# Patient Record
Sex: FEMALE | Race: WHITE | NOT HISPANIC OR LATINO | URBAN - METROPOLITAN AREA
[De-identification: names, ages, dates, MRNs, and addresses within clinical notes are randomized per-mention and may not be internally consistent; named-entity substitution may affect disease eponyms.]

---

## 2020-02-25 ENCOUNTER — APPOINTMENT (RX ONLY)
Dept: URBAN - METROPOLITAN AREA CLINIC 123 | Facility: CLINIC | Age: 75
Setting detail: DERMATOLOGY
End: 2020-02-25

## 2020-02-25 DIAGNOSIS — D22 MELANOCYTIC NEVI: ICD-10-CM

## 2020-02-25 DIAGNOSIS — L82.1 OTHER SEBORRHEIC KERATOSIS: ICD-10-CM

## 2020-02-25 DIAGNOSIS — L81.4 OTHER MELANIN HYPERPIGMENTATION: ICD-10-CM

## 2020-02-25 DIAGNOSIS — D18.0 HEMANGIOMA: ICD-10-CM

## 2020-02-25 PROBLEM — D18.01 HEMANGIOMA OF SKIN AND SUBCUTANEOUS TISSUE: Status: ACTIVE | Noted: 2020-02-25

## 2020-02-25 PROBLEM — D22.5 MELANOCYTIC NEVI OF TRUNK: Status: ACTIVE | Noted: 2020-02-25

## 2020-02-25 PROCEDURE — ? FULL BODY SKIN EXAM

## 2020-02-25 PROCEDURE — 99203 OFFICE O/P NEW LOW 30 MIN: CPT

## 2020-02-25 PROCEDURE — ? COUNSELING

## 2020-02-25 ASSESSMENT — LOCATION DETAILED DESCRIPTION DERM
LOCATION DETAILED: SUPERIOR THORACIC SPINE
LOCATION DETAILED: LEFT ULNAR DORSAL HAND
LOCATION DETAILED: PERIUMBILICAL SKIN
LOCATION DETAILED: RIGHT RADIAL DORSAL HAND
LOCATION DETAILED: LEFT INFERIOR CENTRAL MALAR CHEEK

## 2020-02-25 ASSESSMENT — LOCATION ZONE DERM
LOCATION ZONE: FACE
LOCATION ZONE: TRUNK
LOCATION ZONE: HAND

## 2020-02-25 ASSESSMENT — LOCATION SIMPLE DESCRIPTION DERM
LOCATION SIMPLE: ABDOMEN
LOCATION SIMPLE: RIGHT HAND
LOCATION SIMPLE: LEFT HAND
LOCATION SIMPLE: UPPER BACK
LOCATION SIMPLE: LEFT CHEEK

## 2020-02-25 NOTE — PROCEDURE: MIPS QUALITY
Quality 402: Tobacco Use And Help With Quitting Among Adolescents: Patient screened for tobacco and is an ex-smoker
Quality 431: Preventive Care And Screening: Unhealthy Alcohol Use - Screening: Patient screened for unhealthy alcohol use using a single question and scores 2 or greater episodes per year and brief intervention occurred
Detail Level: Detailed
Quality 130: Documentation Of Current Medications In The Medical Record: Current Medications Documented

## 2021-09-14 ENCOUNTER — APPOINTMENT (RX ONLY)
Dept: URBAN - METROPOLITAN AREA CLINIC 123 | Facility: CLINIC | Age: 76
Setting detail: DERMATOLOGY
End: 2021-09-14

## 2021-09-14 DIAGNOSIS — L82.1 OTHER SEBORRHEIC KERATOSIS: ICD-10-CM

## 2021-09-14 DIAGNOSIS — L81.4 OTHER MELANIN HYPERPIGMENTATION: ICD-10-CM

## 2021-09-14 DIAGNOSIS — L91.8 OTHER HYPERTROPHIC DISORDERS OF THE SKIN: ICD-10-CM

## 2021-09-14 DIAGNOSIS — D22 MELANOCYTIC NEVI: ICD-10-CM

## 2021-09-14 DIAGNOSIS — L98.8 OTHER SPECIFIED DISORDERS OF THE SKIN AND SUBCUTANEOUS TISSUE: ICD-10-CM

## 2021-09-14 DIAGNOSIS — L81.5 LEUKODERMA, NOT ELSEWHERE CLASSIFIED: ICD-10-CM

## 2021-09-14 DIAGNOSIS — F42.4 EXCORIATION (SKIN-PICKING) DISORDER: ICD-10-CM

## 2021-09-14 DIAGNOSIS — D18.0 HEMANGIOMA: ICD-10-CM

## 2021-09-14 PROBLEM — D18.01 HEMANGIOMA OF SKIN AND SUBCUTANEOUS TISSUE: Status: ACTIVE | Noted: 2021-09-14

## 2021-09-14 PROBLEM — D23.72 OTHER BENIGN NEOPLASM OF SKIN OF LEFT LOWER LIMB, INCLUDING HIP: Status: ACTIVE | Noted: 2021-09-14

## 2021-09-14 PROBLEM — S30.91XA UNSPECIFIED SUPERFICIAL INJURY OF LOWER BACK AND PELVIS, INITIAL ENCOUNTER: Status: ACTIVE | Noted: 2021-09-14

## 2021-09-14 PROBLEM — D22.5 MELANOCYTIC NEVI OF TRUNK: Status: ACTIVE | Noted: 2021-09-14

## 2021-09-14 PROCEDURE — ? ADDITIONAL NOTES

## 2021-09-14 PROCEDURE — ? COUNSELING

## 2021-09-14 PROCEDURE — 99213 OFFICE O/P EST LOW 20 MIN: CPT

## 2021-09-14 PROCEDURE — ? BOTOX (U OR CC)

## 2021-09-14 PROCEDURE — ? SUNSCREEN RECOMMENDATIONS

## 2021-09-14 PROCEDURE — ? FULL BODY SKIN EXAM

## 2021-09-14 PROCEDURE — ? TREATMENT REGIMEN

## 2021-09-14 ASSESSMENT — LOCATION DETAILED DESCRIPTION DERM
LOCATION DETAILED: LEFT CENTRAL EYEBROW
LOCATION DETAILED: RIGHT PROXIMAL DORSAL FOREARM
LOCATION DETAILED: LEFT MEDIAL SUPERIOR CHEST
LOCATION DETAILED: LEFT PROXIMAL DORSAL FOREARM
LOCATION DETAILED: GLABELLA
LOCATION DETAILED: RIGHT FOREHEAD
LOCATION DETAILED: RIGHT SUPERIOR UPPER BACK
LOCATION DETAILED: LEFT LATERAL SUPERIOR EYELID
LOCATION DETAILED: RIGHT LATERAL EYEBROW
LOCATION DETAILED: RIGHT SUPERIOR LATERAL MALAR CHEEK
LOCATION DETAILED: LEFT INFERIOR LATERAL LOWER BACK
LOCATION DETAILED: EPIGASTRIC SKIN
LOCATION DETAILED: LEFT MEDIAL EYEBROW
LOCATION DETAILED: RIGHT CENTRAL EYEBROW
LOCATION DETAILED: LEFT FOREHEAD
LOCATION DETAILED: LEFT INFERIOR UPPER BACK
LOCATION DETAILED: RIGHT MEDIAL EYEBROW
LOCATION DETAILED: LEFT SUPERIOR CENTRAL MALAR CHEEK
LOCATION DETAILED: RIGHT INFERIOR TEMPLE
LOCATION DETAILED: LEFT LATERAL CANTHUS
LOCATION DETAILED: LEFT LATERAL EYEBROW
LOCATION DETAILED: RIGHT MEDIAL FOREHEAD

## 2021-09-14 ASSESSMENT — LOCATION SIMPLE DESCRIPTION DERM
LOCATION SIMPLE: LEFT SUPERIOR EYELID
LOCATION SIMPLE: RIGHT CHEEK
LOCATION SIMPLE: RIGHT TEMPLE
LOCATION SIMPLE: LEFT EYELID
LOCATION SIMPLE: LEFT FOREARM
LOCATION SIMPLE: CHEST
LOCATION SIMPLE: RIGHT FOREARM
LOCATION SIMPLE: RIGHT EYEBROW
LOCATION SIMPLE: LEFT EYEBROW
LOCATION SIMPLE: LEFT FOREHEAD
LOCATION SIMPLE: LEFT CHEEK
LOCATION SIMPLE: LEFT UPPER BACK
LOCATION SIMPLE: RIGHT FOREHEAD
LOCATION SIMPLE: GLABELLA
LOCATION SIMPLE: LEFT LOWER BACK
LOCATION SIMPLE: ABDOMEN
LOCATION SIMPLE: RIGHT UPPER BACK

## 2021-09-14 ASSESSMENT — LOCATION ZONE DERM
LOCATION ZONE: EYELID
LOCATION ZONE: ARM
LOCATION ZONE: FACE
LOCATION ZONE: TRUNK

## 2021-09-14 NOTE — PROCEDURE: ADDITIONAL NOTES
Detail Level: Simple
Render Risk Assessment In Note?: yes
Additional Notes: Patient consent was obtained to proceed with the visit and recommended plan of care after discussion of all risks and benefits, including the risks of COVID-19 exposure.
Render Risk Assessment In Note?: no
Additional Notes: 10% off; 2 units N/C- accidentally heri up 2u extra

## 2021-09-14 NOTE — PROCEDURE: MIPS QUALITY
Detail Level: Detailed
Quality 402: Tobacco Use And Help With Quitting Among Adolescents: Patient screened for tobacco and is an ex-smoker
Quality 431: Preventive Care And Screening: Unhealthy Alcohol Use - Screening: Patient identified as an unhealthy alcohol user when screened for unhealthy alcohol use using a systematic screening method and received brief counseling
Quality 226: Preventive Care And Screening: Tobacco Use: Screening And Cessation Intervention: Patient screened for tobacco use and is an ex/non-smoker

## 2021-09-30 ENCOUNTER — APPOINTMENT (RX ONLY)
Dept: URBAN - METROPOLITAN AREA CLINIC 123 | Facility: CLINIC | Age: 76
Setting detail: DERMATOLOGY
End: 2021-09-30

## 2021-09-30 DIAGNOSIS — L82.0 INFLAMED SEBORRHEIC KERATOSIS: ICD-10-CM

## 2021-09-30 PROCEDURE — ? ADDITIONAL NOTES

## 2021-09-30 PROCEDURE — ? LIQUID NITROGEN

## 2021-09-30 PROCEDURE — ? FULL BODY SKIN EXAM - DECLINED

## 2021-09-30 PROCEDURE — ? COUNSELING

## 2021-09-30 PROCEDURE — 17110 DESTRUCTION B9 LES UP TO 14: CPT

## 2021-09-30 ASSESSMENT — LOCATION SIMPLE DESCRIPTION DERM
LOCATION SIMPLE: RIGHT FOREHEAD
LOCATION SIMPLE: LEFT CHEEK
LOCATION SIMPLE: LEFT FOREHEAD

## 2021-09-30 ASSESSMENT — LOCATION DETAILED DESCRIPTION DERM
LOCATION DETAILED: RIGHT FOREHEAD
LOCATION DETAILED: LEFT FOREHEAD
LOCATION DETAILED: LEFT SUPERIOR FOREHEAD
LOCATION DETAILED: RIGHT INFERIOR MEDIAL FOREHEAD
LOCATION DETAILED: LEFT INFERIOR MEDIAL FOREHEAD
LOCATION DETAILED: LEFT SUPERIOR LATERAL BUCCAL CHEEK
LOCATION DETAILED: LEFT SUPERIOR CENTRAL MALAR CHEEK

## 2021-09-30 ASSESSMENT — LOCATION ZONE DERM: LOCATION ZONE: FACE

## 2023-03-30 ENCOUNTER — APPOINTMENT (RX ONLY)
Dept: URBAN - METROPOLITAN AREA CLINIC 123 | Facility: CLINIC | Age: 78
Setting detail: DERMATOLOGY
End: 2023-03-30

## 2023-03-30 DIAGNOSIS — L81.4 OTHER MELANIN HYPERPIGMENTATION: ICD-10-CM | Status: INADEQUATELY CONTROLLED

## 2023-03-30 DIAGNOSIS — L82.0 INFLAMED SEBORRHEIC KERATOSIS: ICD-10-CM | Status: INADEQUATELY CONTROLLED

## 2023-03-30 PROBLEM — D48.5 NEOPLASM OF UNCERTAIN BEHAVIOR OF SKIN: Status: ACTIVE | Noted: 2023-03-30

## 2023-03-30 PROCEDURE — ? BIOPSY BY PUNCH METHOD

## 2023-03-30 PROCEDURE — 11104 PUNCH BX SKIN SINGLE LESION: CPT

## 2023-03-30 PROCEDURE — ? COUNSELING

## 2023-03-30 PROCEDURE — 17110 DESTRUCTION B9 LES UP TO 14: CPT | Mod: 59

## 2023-03-30 PROCEDURE — ? ADDITIONAL NOTES

## 2023-03-30 PROCEDURE — ? LIQUID NITROGEN

## 2023-03-30 ASSESSMENT — LOCATION DETAILED DESCRIPTION DERM
LOCATION DETAILED: LEFT INFERIOR PREAURICULAR CHEEK
LOCATION DETAILED: LEFT LATERAL MALAR CHEEK
LOCATION DETAILED: LEFT INFERIOR LATERAL MALAR CHEEK
LOCATION DETAILED: RIGHT INFERIOR PREAURICULAR CHEEK

## 2023-03-30 ASSESSMENT — LOCATION SIMPLE DESCRIPTION DERM
LOCATION SIMPLE: RIGHT CHEEK
LOCATION SIMPLE: LEFT CHEEK

## 2023-03-30 ASSESSMENT — LOCATION ZONE DERM: LOCATION ZONE: FACE

## 2023-03-30 NOTE — PROCEDURE: BIOPSY BY PUNCH METHOD
Body Location Override (Optional - Billing Will Still Be Based On Selected Body Map Location If Applicable): left cheek
Detail Level: Detailed
Was A Bandage Applied: Yes
Punch Size In Mm: 4
Size Of Lesion In Cm (Optional): 0
Depth Of Punch Biopsy: dermis
Biopsy Type: H and E
Anesthesia Type: 1% lidocaine with 1:100,000 epinephrine
Anesthesia Volume In Cc (Will Not Render If 0): 0.5
Hemostasis: None
Epidermal Sutures: 6-0 Nylon
Wound Care: No ointment
Dressing: Band-Aid
Suture Removal: 7 days
Patient Will Remove Sutures At Home?: No
Lab: 6
Lab Facility: 3
Path Notes (To The Dermatopathologist): Partial sample of larger lesion
Consent: Verbal and written consent was obtained and risks were reviewed including but not limited to scarring, infection, and bleeding.
Post-Care Instructions: We reviewed with the patient post-care instructions. Patient is to keep the biopsy site dry overnight, and then apply petrolatum (such as CeraVe healing ointment, Vaseline, or Vanicream ointment) twice daily until healed.
Home Suture Removal Text: Patient was provided a home suture removal kit and will remove their sutures at home.  If they have any questions or difficulties they will call the office.
Notification Instructions: Patient will be notified of biopsy results. However, patient instructed to call the office if not contacted within 2 weeks.
Billing Type: Third-Party Bill
Information: Selecting Yes will display possible errors in your note based on the variables you have selected. This validation is only offered as a suggestion for you. PLEASE NOTE THAT THE VALIDATION TEXT WILL BE REMOVED WHEN YOU FINALIZE YOUR NOTE. IF YOU WANT TO FAX A PRELIMINARY NOTE YOU WILL NEED TO TOGGLE THIS TO 'NO' IF YOU DO NOT WANT IT IN YOUR FAXED NOTE.

## 2023-03-30 NOTE — PROCEDURE: ADDITIONAL NOTES
Additional Notes: Partial sample performed of larger lesion.
Render Risk Assessment In Note?: no
Detail Level: Detailed
Additional Notes: Pt interested in tx but appearance (although most likely collision of SK and lentigo) is irregular.  Bx advised before proceeding. She was in agreement.

## 2023-03-30 NOTE — PROCEDURE: LIQUID NITROGEN
Consent: The patient's consent was obtained including but not limited to risks of crusting, scabbing, blistering, scarring, darker or lighter pigmentary change, recurrence, incomplete removal and infection.
Medical Necessity Information: It is in your best interest to select a reason for this procedure from the list below. All of these items fulfill various CMS LCD requirements except the new and changing color options.
Add 52 Modifier (Optional): no
Medical Necessity Clause: This procedure was medically necessary because the lesions that were treated were:
Show Topical Anesthesia Variable?: Yes
Detail Level: Detailed
Post-Care Instructions: I reviewed with the patient in detail post-care instructions. The patient will apply Vaseline or other petrolatum based emollient to affected areas as needed for healing.
Spray Paint Text: The liquid nitrogen was applied to the skin utilizing a spray paint frosting technique.

## 2023-04-05 ENCOUNTER — APPOINTMENT (RX ONLY)
Dept: URBAN - METROPOLITAN AREA CLINIC 123 | Facility: CLINIC | Age: 78
Setting detail: DERMATOLOGY
End: 2023-04-05

## 2023-04-05 DIAGNOSIS — L82.0 INFLAMED SEBORRHEIC KERATOSIS: ICD-10-CM | Status: INADEQUATELY CONTROLLED

## 2023-04-05 DIAGNOSIS — D22 MELANOCYTIC NEVI: ICD-10-CM

## 2023-04-05 DIAGNOSIS — L81.4 OTHER MELANIN HYPERPIGMENTATION: ICD-10-CM

## 2023-04-05 DIAGNOSIS — L82.1 OTHER SEBORRHEIC KERATOSIS: ICD-10-CM

## 2023-04-05 PROBLEM — D22.5 MELANOCYTIC NEVI OF TRUNK: Status: ACTIVE | Noted: 2023-04-05

## 2023-04-05 PROBLEM — D48.5 NEOPLASM OF UNCERTAIN BEHAVIOR OF SKIN: Status: ACTIVE | Noted: 2023-04-05

## 2023-04-05 PROCEDURE — 99213 OFFICE O/P EST LOW 20 MIN: CPT | Mod: 24

## 2023-04-05 PROCEDURE — ? TREATMENT REGIMEN

## 2023-04-05 PROCEDURE — ? PRESCRIPTION

## 2023-04-05 PROCEDURE — ? SUTURE REMOVAL

## 2023-04-05 PROCEDURE — ? COUNSELING

## 2023-04-05 PROCEDURE — ? FULL BODY SKIN EXAM

## 2023-04-05 RX ORDER — TRETINOIN 1 MG/G
CREAM TOPICAL
Qty: 20 | Refills: 11 | Status: ERX | COMMUNITY
Start: 2023-04-05

## 2023-04-05 RX ADMIN — TRETINOIN: 1 CREAM TOPICAL at 00:00

## 2023-04-05 ASSESSMENT — LOCATION DETAILED DESCRIPTION DERM
LOCATION DETAILED: RIGHT INFERIOR LATERAL MALAR CHEEK
LOCATION DETAILED: LEFT INFERIOR LATERAL MALAR CHEEK
LOCATION DETAILED: SUPERIOR LUMBAR SPINE
LOCATION DETAILED: LEFT INFERIOR MEDIAL UPPER BACK
LOCATION DETAILED: INFERIOR LUMBAR SPINE
LOCATION DETAILED: LEFT SUPERIOR LATERAL BUCCAL CHEEK

## 2023-04-05 ASSESSMENT — LOCATION SIMPLE DESCRIPTION DERM
LOCATION SIMPLE: LOWER BACK
LOCATION SIMPLE: LEFT CHEEK
LOCATION SIMPLE: RIGHT CHEEK
LOCATION SIMPLE: LEFT UPPER BACK

## 2023-04-05 ASSESSMENT — LOCATION ZONE DERM
LOCATION ZONE: TRUNK
LOCATION ZONE: FACE

## 2023-04-05 NOTE — PROCEDURE: SUTURE REMOVAL
Body Location Override (Optional - Billing Will Still Be Based On Selected Body Map Location If Applicable): left cheek
Detail Level: Detailed
Add 1585x Cpt? (Do Not Bill If You Billed For The Procedure Placing The Sutures. This Is An Add-On Code That Must Be Billed With An E/M Visit Code): No

## 2023-04-05 NOTE — PROCEDURE: COUNSELING
Detail Level: Generalized
Detail Level: Detailed
Patient Specific Counseling (Will Not Stick From Patient To Patient): These were treated last week with LN2 but have not reacted as the patient has experienced in the past.  She requested another round of cryotherapy.  Two 7-10 second freeze thaw cycles performed to each location after reviewing risk of permanent scar, discoloration, irritation, inflammation, and crusting/scabbing/blistering.  Vaseline to be used as needed until healed.

## 2024-02-23 ENCOUNTER — APPOINTMENT (RX ONLY)
Dept: URBAN - METROPOLITAN AREA CLINIC 123 | Facility: CLINIC | Age: 79
Setting detail: DERMATOLOGY
End: 2024-02-23

## 2024-02-23 DIAGNOSIS — L82.0 INFLAMED SEBORRHEIC KERATOSIS: ICD-10-CM

## 2024-02-23 PROCEDURE — ? COUNSELING

## 2024-02-23 PROCEDURE — ? LIQUID NITROGEN

## 2024-02-23 PROCEDURE — 17111 DESTRUCTION B9 LESIONS 15/>: CPT

## 2024-02-23 ASSESSMENT — LOCATION DETAILED DESCRIPTION DERM
LOCATION DETAILED: RIGHT INFERIOR LATERAL FOREHEAD
LOCATION DETAILED: LEFT LATERAL CANTHUS
LOCATION DETAILED: LEFT INFERIOR LATERAL MALAR CHEEK
LOCATION DETAILED: LEFT INFERIOR LATERAL BUCCAL CHEEK
LOCATION DETAILED: LEFT MID TEMPLE
LOCATION DETAILED: LEFT SUPERIOR FOREHEAD
LOCATION DETAILED: RIGHT INFERIOR LATERAL MALAR CHEEK
LOCATION DETAILED: LEFT LATERAL EYEBROW
LOCATION DETAILED: RIGHT CENTRAL BUCCAL CHEEK
LOCATION DETAILED: RIGHT INFERIOR FOREHEAD
LOCATION DETAILED: RIGHT LATERAL TEMPLE
LOCATION DETAILED: RIGHT LATERAL FOREHEAD
LOCATION DETAILED: LEFT CENTRAL TEMPLE
LOCATION DETAILED: NASAL ROOT
LOCATION DETAILED: RIGHT LATERAL EYEBROW
LOCATION DETAILED: RIGHT INFERIOR CENTRAL MALAR CHEEK
LOCATION DETAILED: RIGHT CENTRAL MANDIBULAR CHEEK
LOCATION DETAILED: LEFT FOREHEAD
LOCATION DETAILED: RIGHT MEDIAL MALAR CHEEK
LOCATION DETAILED: LEFT CENTRAL BUCCAL CHEEK
LOCATION DETAILED: RIGHT FOREHEAD
LOCATION DETAILED: RIGHT LATERAL MANDIBULAR CHEEK
LOCATION DETAILED: RIGHT SUPERIOR LATERAL BUCCAL CHEEK
LOCATION DETAILED: LEFT LATERAL FOREHEAD

## 2024-02-23 ASSESSMENT — LOCATION ZONE DERM
LOCATION ZONE: FACE
LOCATION ZONE: EYELID
LOCATION ZONE: NOSE

## 2024-02-23 ASSESSMENT — LOCATION SIMPLE DESCRIPTION DERM
LOCATION SIMPLE: RIGHT TEMPLE
LOCATION SIMPLE: LEFT EYEBROW
LOCATION SIMPLE: RIGHT FOREHEAD
LOCATION SIMPLE: LEFT TEMPLE
LOCATION SIMPLE: NOSE
LOCATION SIMPLE: LEFT CHEEK
LOCATION SIMPLE: LEFT EYELID
LOCATION SIMPLE: RIGHT CHEEK
LOCATION SIMPLE: RIGHT EYEBROW
LOCATION SIMPLE: LEFT FOREHEAD

## 2024-02-23 NOTE — PROCEDURE: LIQUID NITROGEN
Spray Paint Text: The liquid nitrogen was applied to the skin utilizing a spray paint frosting technique.
Post-Care Instructions: I reviewed with the patient in detail post-care instructions. Patient is to wear sunprotection, and avoid picking at any of the treated lesions. Pt may apply Vaseline to crusted or scabbing areas.
Show Aperture Variable?: Yes
Number Of Freeze-Thaw Cycles: 3 freeze-thaw cycles
Medical Necessity Clause: This procedure was medically necessary because the lesions that were treated were:
Spray Paint Technique: No
Consent: The patient's consent was obtained including but not limited to risks of crusting, scabbing, blistering, scarring, darker or lighter pigmentary change, recurrence, incomplete removal and infection.
Detail Level: Detailed
Medical Necessity Information: It is in your best interest to select a reason for this procedure from the list below. All of these items fulfill various CMS LCD requirements except the new and changing color options.
Duration Of Freeze Thaw-Cycle (Seconds): 6

## 2024-02-23 NOTE — HPI: SKIN LESION (IRRITATED SKIN TAGS)
How Severe Are They?: moderate
Is This A New Presentation, Or A Follow-Up?: Irritated Skin Lesion
Additional History: Presented here for more LN2

## 2024-03-15 ENCOUNTER — APPOINTMENT (RX ONLY)
Dept: URBAN - METROPOLITAN AREA CLINIC 123 | Facility: CLINIC | Age: 79
Setting detail: DERMATOLOGY
End: 2024-03-15

## 2024-03-15 DIAGNOSIS — Z41.9 ENCOUNTER FOR PROCEDURE FOR PURPOSES OTHER THAN REMEDYING HEALTH STATE, UNSPECIFIED: ICD-10-CM

## 2024-03-15 DIAGNOSIS — L82.0 INFLAMED SEBORRHEIC KERATOSIS: ICD-10-CM

## 2024-03-15 PROCEDURE — ? COSMETIC CONSULTATION: FILLERS

## 2024-03-15 PROCEDURE — ? ADDITIONAL NOTES

## 2024-03-15 PROCEDURE — 17110 DESTRUCTION B9 LES UP TO 14: CPT

## 2024-03-15 PROCEDURE — ? COSMETIC CONSULTATION: BOTOX

## 2024-03-15 PROCEDURE — ? COUNSELING

## 2024-03-15 PROCEDURE — ? LIQUID NITROGEN

## 2024-03-15 ASSESSMENT — LOCATION ZONE DERM: LOCATION ZONE: FACE

## 2024-03-15 ASSESSMENT — LOCATION SIMPLE DESCRIPTION DERM: LOCATION SIMPLE: LEFT CHEEK

## 2024-03-15 ASSESSMENT — LOCATION DETAILED DESCRIPTION DERM: LOCATION DETAILED: LEFT INFERIOR CENTRAL MALAR CHEEK

## 2024-03-15 NOTE — PROCEDURE: LIQUID NITROGEN
Medical Necessity Information: It is in your best interest to select a reason for this procedure from the list below. All of these items fulfill various CMS LCD requirements except the new and changing color options.
Consent: The patient's consent was obtained including but not limited to risks of crusting, scabbing, blistering, scarring, darker or lighter pigmentary change, recurrence, incomplete removal and infection.
Add 52 Modifier (Optional): no
Show Aperture Variable?: Yes
Detail Level: Detailed
Medical Necessity Clause: This procedure was medically necessary because the lesions that were treated were:
Number Of Freeze-Thaw Cycles: 3 freeze-thaw cycles
Duration Of Freeze Thaw-Cycle (Seconds): 6
Spray Paint Text: The liquid nitrogen was applied to the skin utilizing a spray paint frosting technique.
Post-Care Instructions: I reviewed with the patient in detail post-care instructions. Patient is to wear sunprotection, and avoid picking at any of the treated lesions. Pt may apply Vaseline to crusted or scabbing areas.

## 2024-03-15 NOTE — PROCEDURE: ADDITIONAL NOTES
Render Risk Assessment In Note?: no
Detail Level: Simple
Additional Notes: Discussed pricing and procedure. Rec: Botox 45 units-$675/ Restylane Contour- $800 1-2 cheeks/ Restylane Kysse-$700\\nAdvs numbing 1 hr prior and gave bruising handout.

## 2024-09-19 ENCOUNTER — APPOINTMENT (RX ONLY)
Dept: URBAN - METROPOLITAN AREA CLINIC 123 | Facility: CLINIC | Age: 79
Setting detail: DERMATOLOGY
End: 2024-09-19

## 2024-09-19 DIAGNOSIS — D22 MELANOCYTIC NEVI: ICD-10-CM

## 2024-09-19 DIAGNOSIS — D18.0 HEMANGIOMA: ICD-10-CM

## 2024-09-19 DIAGNOSIS — L85.3 XEROSIS CUTIS: ICD-10-CM

## 2024-09-19 DIAGNOSIS — L81.4 OTHER MELANIN HYPERPIGMENTATION: ICD-10-CM | Status: STABLE

## 2024-09-19 DIAGNOSIS — L82.1 OTHER SEBORRHEIC KERATOSIS: ICD-10-CM

## 2024-09-19 DIAGNOSIS — L57.8 OTHER SKIN CHANGES DUE TO CHRONIC EXPOSURE TO NONIONIZING RADIATION: ICD-10-CM

## 2024-09-19 PROBLEM — D22.5 MELANOCYTIC NEVI OF TRUNK: Status: ACTIVE | Noted: 2024-09-19

## 2024-09-19 PROBLEM — D18.01 HEMANGIOMA OF SKIN AND SUBCUTANEOUS TISSUE: Status: ACTIVE | Noted: 2024-09-19

## 2024-09-19 PROCEDURE — ? COUNSELING

## 2024-09-19 PROCEDURE — ? FULL BODY SKIN EXAM

## 2024-09-19 PROCEDURE — ? ADDITIONAL NOTES

## 2024-09-19 PROCEDURE — 99213 OFFICE O/P EST LOW 20 MIN: CPT

## 2024-09-19 PROCEDURE — ? SUNSCREEN RECOMMENDATIONS

## 2024-09-19 PROCEDURE — ? PRESCRIPTION

## 2024-09-19 RX ORDER — TRETIONIN 1 MG/G
CREAM TOPICAL QHS
Qty: 20 | Refills: 11 | Status: ERX | COMMUNITY
Start: 2024-09-19

## 2024-09-19 RX ADMIN — TRETIONIN: 1 CREAM TOPICAL at 00:00

## 2024-09-19 ASSESSMENT — LOCATION ZONE DERM
LOCATION ZONE: ARM
LOCATION ZONE: TRUNK
LOCATION ZONE: FACE

## 2024-09-19 ASSESSMENT — LOCATION DETAILED DESCRIPTION DERM
LOCATION DETAILED: LEFT INFERIOR MEDIAL FOREHEAD
LOCATION DETAILED: RIGHT MID-UPPER BACK
LOCATION DETAILED: RIGHT RIB CAGE
LOCATION DETAILED: LEFT SUPERIOR UPPER BACK
LOCATION DETAILED: RIGHT POSTERIOR SHOULDER
LOCATION DETAILED: LEFT DISTAL DORSAL FOREARM

## 2024-09-19 ASSESSMENT — LOCATION SIMPLE DESCRIPTION DERM
LOCATION SIMPLE: ABDOMEN
LOCATION SIMPLE: LEFT UPPER BACK
LOCATION SIMPLE: LEFT FOREHEAD
LOCATION SIMPLE: LEFT FOREARM
LOCATION SIMPLE: RIGHT UPPER BACK
LOCATION SIMPLE: RIGHT SHOULDER

## 2024-09-19 ASSESSMENT — PAIN INTENSITY VAS: HOW INTENSE IS YOUR PAIN 0 BEING NO PAIN, 10 BEING THE MOST SEVERE PAIN POSSIBLE?: NO PAIN
